# Patient Record
Sex: MALE | Race: WHITE | NOT HISPANIC OR LATINO | URBAN - METROPOLITAN AREA
[De-identification: names, ages, dates, MRNs, and addresses within clinical notes are randomized per-mention and may not be internally consistent; named-entity substitution may affect disease eponyms.]

---

## 2018-04-30 NOTE — PATIENT DISCUSSION
SUBCONJUNCTIVAL HEMORRHAGE, OS__: NO TREATMENT INDICATED. REASSURANCE GIVEN. RECOMMEND A. TEARS AS NEEDED FOR COMFORT.

## 2018-04-30 NOTE — PATIENT DISCUSSION
EPISCLERITIS, OS__:  PRESCRIBE MOTRIN 600MG TID, COOL COMPRESSES AND ARTIFICIAL TEARS ALSO USE FML TID OS FOR 1 WEEK. RETURN AS SCHEDULED.

## 2019-03-13 NOTE — PATIENT DISCUSSION
Keratoconus Counseling: The diagnosis and its pathophysiology has been explained to the patient. 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 They have been informed that they are not a candidate for refractive surgery. Keratoconus may be worsened by eye rubbing and ocular allergies. Treatment options for ocular allergies include topical antihistamines and mast cell stabilizers, oral antihistamines, and possibly allergy  shots. Treatment options for keratoconus include cessation of eye rubbing, contact lens, collagen crosslinking, and /or corneal inlays. Corneal transplant surgery is a surgical option for the treatment of keratoconus, but is only performed when other treatment options have failed. Return for follow-up as scheduled.

## 2020-06-15 NOTE — PATIENT DISCUSSION
Continue: erythromycin (erythromycin): ointment: 5 mg/gram (0.5 %) a small amount at bedtime into both eyes 05-

## 2021-10-20 ENCOUNTER — LASIK CONSULTATION (OUTPATIENT)
Dept: URBAN - METROPOLITAN AREA CLINIC 14 | Facility: CLINIC | Age: 56
End: 2021-10-20

## 2021-10-20 DIAGNOSIS — Z98.890: ICD-10-CM

## 2021-10-20 PROCEDURE — 99211PRE PRE OP VISIT

## 2021-11-03 ENCOUNTER — ESTABLISHED PATIENT (OUTPATIENT)
Dept: URBAN - METROPOLITAN AREA CLINIC 14 | Facility: CLINIC | Age: 56
End: 2021-11-03

## 2021-11-03 DIAGNOSIS — H25.13: ICD-10-CM

## 2021-11-03 PROCEDURE — 99199ADVT ADVANCED VISION TESTING

## 2021-11-03 ASSESSMENT — KERATOMETRY
OD_K1POWER_DIOPTERS: 39.50
OS_K2POWER_DIOPTERS: 40.50
OS_K1POWER_DIOPTERS: 40.00
OS_AXISANGLE2_DEGREES: 113
OD_AXISANGLE_DEGREES: 0
OD_AXISANGLE2_DEGREES: 90
OS_AXISANGLE_DEGREES: 23
OD_K2POWER_DIOPTERS: 39.50

## 2021-11-03 ASSESSMENT — VISUAL ACUITY
OS_SC: 20/70
OD_SC: 20/20
OD_BCVA: 20/20
OS_BCVA: 20/25
OS_SC: J2
OD_SC: J16

## 2021-11-03 ASSESSMENT — TONOMETRY
OS_IOP_MMHG: 15
OD_IOP_MMHG: 15

## 2022-04-27 ENCOUNTER — ESTABLISHED PATIENT (OUTPATIENT)
Dept: URBAN - METROPOLITAN AREA CLINIC 14 | Facility: CLINIC | Age: 57
End: 2022-04-27

## 2022-04-27 DIAGNOSIS — H25.13: ICD-10-CM

## 2022-04-27 PROCEDURE — 99211NC NO CHARGE VISIT

## 2022-04-27 ASSESSMENT — VISUAL ACUITY
OD_SC: 20/25
OS_BCVA: 20/25
OD_BCVA: 20/20
OS_SC: J1

## 2022-04-27 ASSESSMENT — KERATOMETRY
OS_AXISANGLE2_DEGREES: 105
OS_AXISANGLE_DEGREES: 15
OS_K2POWER_DIOPTERS: 41.00
OD_AXISANGLE2_DEGREES: 90
OD_AXISANGLE_DEGREES: 180
OD_K2POWER_DIOPTERS: 39.75
OD_K1POWER_DIOPTERS: 39.25
OS_K1POWER_DIOPTERS: 40.00

## 2022-04-27 ASSESSMENT — TONOMETRY
OS_IOP_MMHG: 16
OD_IOP_MMHG: 15

## 2022-10-26 ENCOUNTER — ESTABLISHED PATIENT (OUTPATIENT)
Dept: URBAN - METROPOLITAN AREA CLINIC 14 | Facility: CLINIC | Age: 57
End: 2022-10-26

## 2022-10-26 DIAGNOSIS — H25.13: ICD-10-CM

## 2022-10-26 PROCEDURE — 92014 COMPRE OPH EXAM EST PT 1/>: CPT

## 2022-10-26 ASSESSMENT — VISUAL ACUITY
OD_SC: 20/20
OU_SC: 20/25
OD_BCVA: 20/20
OS_SC: 20/40
OS_SC: J1-1
OS_BCVA: 20/30
OU_SC: J1-1

## 2022-10-26 ASSESSMENT — KERATOMETRY
OD_AXISANGLE2_DEGREES: 98
OS_AXISANGLE_DEGREES: 002
OD_AXISANGLE_DEGREES: 008
OD_K1POWER_DIOPTERS: 39.50
OS_AXISANGLE2_DEGREES: 92
OS_K2POWER_DIOPTERS: 41.00
OD_K2POWER_DIOPTERS: 40.00
OS_K1POWER_DIOPTERS: 40.25

## 2022-10-26 ASSESSMENT — TONOMETRY
OS_IOP_MMHG: 20
OD_IOP_MMHG: 17

## 2024-01-12 ENCOUNTER — ESTABLISHED PATIENT (OUTPATIENT)
Dept: URBAN - METROPOLITAN AREA CLINIC 14 | Facility: CLINIC | Age: 59
End: 2024-01-12

## 2024-01-12 DIAGNOSIS — E11.9: ICD-10-CM

## 2024-01-12 DIAGNOSIS — H25.13: ICD-10-CM

## 2024-01-12 PROCEDURE — 92015 DETERMINE REFRACTIVE STATE: CPT

## 2024-01-12 PROCEDURE — 92014 COMPRE OPH EXAM EST PT 1/>: CPT

## 2024-01-12 ASSESSMENT — VISUAL ACUITY
OD_BCVA: 20/20
OD_SC: 20/20
OS_SC: 20/25-1
OS_BCVA: 20/20
OS_SC: J5

## 2024-01-12 ASSESSMENT — KERATOMETRY
OD_AXISANGLE_DEGREES: 008
OD_K2POWER_DIOPTERS: 40.00
OS_K2POWER_DIOPTERS: 41.00
OD_K1POWER_DIOPTERS: 39.50
OD_AXISANGLE2_DEGREES: 98
OS_K1POWER_DIOPTERS: 40.25
OS_AXISANGLE2_DEGREES: 92
OS_AXISANGLE_DEGREES: 002

## 2024-01-12 ASSESSMENT — TONOMETRY
OD_IOP_MMHG: 18
OS_IOP_MMHG: 15

## 2025-08-15 ENCOUNTER — COMPREHENSIVE EXAM (OUTPATIENT)
Age: 60
End: 2025-08-15

## 2025-08-15 DIAGNOSIS — Z98.890: ICD-10-CM

## 2025-08-15 DIAGNOSIS — H25.13: ICD-10-CM

## 2025-08-15 DIAGNOSIS — E11.9: ICD-10-CM

## 2025-08-15 PROCEDURE — 92014 COMPRE OPH EXAM EST PT 1/>: CPT

## 2025-08-15 PROCEDURE — 92015 DETERMINE REFRACTIVE STATE: CPT
